# Patient Record
Sex: FEMALE | Race: WHITE
[De-identification: names, ages, dates, MRNs, and addresses within clinical notes are randomized per-mention and may not be internally consistent; named-entity substitution may affect disease eponyms.]

---

## 2018-08-25 ENCOUNTER — HOSPITAL ENCOUNTER (EMERGENCY)
Dept: HOSPITAL 25 - ED | Age: 58
Discharge: HOME | End: 2018-08-25
Payer: MEDICARE

## 2018-08-25 VITALS — DIASTOLIC BLOOD PRESSURE: 61 MMHG | SYSTOLIC BLOOD PRESSURE: 113 MMHG
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
## 2018-08-25 DIAGNOSIS — R07.89: ICD-10-CM

## 2018-08-25 DIAGNOSIS — Z88.5: ICD-10-CM

## 2018-08-25 DIAGNOSIS — R53.1: ICD-10-CM

## 2018-08-25 DIAGNOSIS — Z91.030: ICD-10-CM

## 2018-08-25 DIAGNOSIS — R06.02: ICD-10-CM

## 2018-08-25 DIAGNOSIS — Z88.6: ICD-10-CM

## 2018-08-25 DIAGNOSIS — R21: ICD-10-CM

## 2018-08-25 DIAGNOSIS — T63.441A: Primary | ICD-10-CM

## 2018-08-25 DIAGNOSIS — Z88.1: ICD-10-CM

## 2018-08-25 DIAGNOSIS — R20.0: ICD-10-CM

## 2018-08-25 PROCEDURE — 96361 HYDRATE IV INFUSION ADD-ON: CPT

## 2018-08-25 PROCEDURE — 96374 THER/PROPH/DIAG INJ IV PUSH: CPT

## 2018-08-25 PROCEDURE — 99283 EMERGENCY DEPT VISIT LOW MDM: CPT

## 2018-08-25 PROCEDURE — 96375 TX/PRO/DX INJ NEW DRUG ADDON: CPT

## 2018-08-25 NOTE — ED
Allergic Reaction/Systemic





- HPI Summary


HPI Summary: 





This patient is a 58 year old female BIBA to [facility] accompanied by friend with a 

chief complaint of weakness since a bee sting PTA. Patient presents with 

diffuse redness over her right arm. Patient states that she took 3 Benadryl 

tabs and one dose of the epipen before calling her PCP. PCP instructed her to 

take another dose of epi and recommended her to ED. Patient notes mild chest 

pain and SOB, which is still present but lessened from onset. The pain is 

currently rated 2/10 in severity. Patient additionally reports numbness in her 

right arm and chest swelling. 





- History of Current Complaint


Chief Complaint: EDAllergicReaction


Time Seen by Provider: 18 10:15


Hx Obtained From: Patient


Onset/Duration: Sudden Onset, Started hours ago


Timing: Constant


Severity Initially: Moderate


Severity Currently: Mild


Pain Intensity: 2


Pain Scale Used: 0-10 Numeric


Location: Other - throat swelling


Alleviating Factor(s): Epinephrine, Other - Benadryl


Associated Signs And Symptoms: Positive: Chest Pain, Difficulty Breathing, Other

: - numbness in right arm, weakness





- Related Hx


Possible Reaction To: Insect - Bee





- Allergies/Home Medications


Allergies/Adverse Reactions: 


 Allergies











Allergy/AdvReac Type Severity Reaction Status Date / Time


 


acetaminophen [From Ultracet] Allergy  Nausea And Verified 18 09:58





   Vomiting  


 


bee venom protein (honey bee) Allergy  Anaphylatic Verified 18 09:58





   Shock  


 


codeine Allergy  Nausea And Verified 18 09:58





   Vomiting  


 


erythromycin base Allergy  Hives Verified 18 09:58


 


tramadol Allergy  Hives Verified 18 09:58














PMH/Surg Hx/FS Hx/Imm Hx


Previously Healthy: Yes


Sensory History: Reports: Hx Contacts or Glasses


   Denies: Other Sensory Impairments


Opthamlomology History: Reports: Hx Contacts or Glasses


   Denies: Hx Legally Blind, Other Sensory Impairments


Neurological History: Reports: Hx Headaches





- Surgical History


Surgery Procedure, Year, and Place: 2006 fusion c 5-6  [place].  [id number]

, left knee-98,gallbladder -,hysterectomy 8/10,.  cataract left eye- 


Infectious Disease History: No


Infectious Disease History: 


   Denies: Traveled Outside the [place] in Last 30 Days





- Family History


Known Family History: Positive: Hypertension





- Social History


Alcohol Use: None


Hx Substance Use: Yes


Substance Use Type: Reports: Prescribed


Substance Use Comment - Amount & Last Used: valium, fiorinal


Hx Tobacco Use: No


Smoking Status (MU): Never Smoked Tobacco





Review of Systems


Negative: Fever


Positive: Chest Pain


Positive: Shortness Of Breath


Positive: Other - redness on right arm


Positive: Weakness, Numbness - over right arm


All Other Systems Reviewed And Are Negative: Yes





Physical Exam





- Summary


Physical Exam Summary: 








General: pale, no pain distress


Skin: erythema on the right lateral forearm


Head: normal


Eyes: EOMI, MELECIO


ENT: swollen upper lip


Neck: supple, nontender


Respiratory: CTA, breath sounds present


Cardiovascular: RRR


Abdomen: soft, nontender


Bowel: present


Musculoskeletal: normal, strength/ROM intact


Neurological: sensory/motor intact, A&O x3


Psychological: affect/mood appropriate





Triage Information Reviewed: Yes


Vital Signs On Initial Exam: 


 Initial Vitals











Temp Pulse Resp BP Pulse Ox


 


 98.0 F   68   18   144/85   100 


 


 18 09:50  18 09:50  18 09:50  18 09:50  18 09:50











Vital Signs Reviewed: Yes





Diagnostics





- Vital Signs


 Vital Signs











  Temp Pulse Resp BP Pulse Ox


 


 18 09:50  98.0 F  68  18  144/85  100














- Laboratory


Lab Statement: Any lab studies that have been ordered have been reviewed, and 

results considered in the medical decision making process.





Allergic Reaction Course/Dx





- Course


Course Of Treatment: This patient is a 58 year old female BIBA to [facility] 

accompanied by friend with a chief complaint of weakness since a bee sting PTA. 

Will double check medication given by EMS and order accordingly. In the ED 

course the patient was given Solu-Medrol 125mg IV, Pepcid 40mg IV.  Patient 

will be discharged with prescription for Epipen, Pepcid, and Prednisone and 

follow up with PCP.  The patient is agreeable with this plan.  Improved in ed.





- Diagnoses


Provider Diagnoses: 


 Allergic reaction to bee sting








Discharge





- Sign-Out/Discharge


Documenting (check all that apply): Patient Departure





- Discharge Plan


Condition: Stable


Disposition: HOME


Prescriptions: 


EPINEPHrine [Epipen 2-Adriano] 0.3 mg IM ONCE #1 inj


Famotidine TAB* [Pepcid 20 MG TAB*] 20 mg PO BID PRN #8 tab


 PRN Reason: Allergy Symptoms


predniSONE TAB* [Deltasone 20 MG TAB*] 40 mg PO DAILY PRN #8 tab


 PRN Reason: Allergy Symptoms


Patient Education Materials:  Insect Bite or Sting (ED), General Allergic 

Reaction (ED)


Referrals: 


Hamilton Jacques MD [Primary Care Provider] - 


Additional Instructions: 


FOLLOW UP WITH YOUR DOCTOR. 


TAKE BENADRYL 50MG EVERY 6 HOURS AS NEEDED.


TAKE PEPCID 20MG TWICE A DAY AS NEEDED.


TAKE THE PREDNISONE AS DIRECTED AS NEEDED.


GET RECHECKED FOR ANY WORSENING OF YOUR CONDITION; SHORTNESS OF BREATH, 

DIFFICULTY WITH BREATHING OR SWALLOWING OR QUESTIONS OR CONCERNS.





- Billing Disposition and Condition


Condition: STABLE


Disposition: Home





- Attestation Statements


Document Initiated by Marj: Yes


Documenting Scribe: Steven Hoang


Provider For Whom Marj is Documenting (Include Credential): Gregorio Chamorro MD


Scribe Attestation: 


Steven MONIQUE scribed for Gregorio Chamorro MD on 18 at 1708. 


Scribe Documentation Reviewed: Yes


Provider Attestation: 


The documentation as recorded by the Steven restrepo accurately reflects 

the service I personally performed and the decisions made by me, Gregorio Chamorro MD